# Patient Record
Sex: FEMALE | Race: WHITE | HISPANIC OR LATINO | Employment: UNEMPLOYED | URBAN - METROPOLITAN AREA
[De-identification: names, ages, dates, MRNs, and addresses within clinical notes are randomized per-mention and may not be internally consistent; named-entity substitution may affect disease eponyms.]

---

## 2017-05-19 PROCEDURE — 93005 ELECTROCARDIOGRAM TRACING: CPT

## 2017-05-20 ENCOUNTER — HOSPITAL ENCOUNTER (EMERGENCY)
Facility: HOSPITAL | Age: 22
Discharge: HOME | End: 2017-05-20
Attending: EMERGENCY MEDICINE | Admitting: EMERGENCY MEDICINE
Payer: COMMERCIAL

## 2017-05-20 VITALS
TEMPERATURE: 98.2 F | WEIGHT: 130 LBS | HEART RATE: 64 BPM | OXYGEN SATURATION: 100 % | RESPIRATION RATE: 18 BRPM | DIASTOLIC BLOOD PRESSURE: 58 MMHG | SYSTOLIC BLOOD PRESSURE: 109 MMHG

## 2017-05-20 DIAGNOSIS — R55 VASOVAGAL SYNCOPE: Primary | ICD-10-CM

## 2017-05-20 DIAGNOSIS — Z34.91 FIRST TRIMESTER PREGNANCY: ICD-10-CM

## 2017-05-20 LAB
ANION GAP BLD CALC-SCNC: 17 MMOL/L (ref 4–13)
ATRIAL RATE: 65 BPM
ATRIAL RATE: 74 BPM
BILIRUB UR QL STRIP: NEGATIVE
BUN BLD-MCNC: 6 MG/DL (ref 5–25)
CA-I BLD-SCNC: 1.21 MMOL/L (ref 1.12–1.32)
CHLORIDE BLD-SCNC: 103 MMOL/L (ref 100–108)
CLARITY UR: CLEAR
COLOR UR: YELLOW
CREAT BLD-MCNC: 0.6 MG/DL (ref 0.6–1.3)
GFR SERPL CREATININE-BSD FRML MDRD: >60 ML/MIN/1.73SQ M
GLUCOSE SERPL-MCNC: 99 MG/DL (ref 65–140)
GLUCOSE UR STRIP-MCNC: NEGATIVE MG/DL
HCT VFR BLD CALC: 35 % (ref 34.8–46.1)
HGB BLDA-MCNC: 11.9 G/DL (ref 11.5–15.4)
HGB UR QL STRIP.AUTO: NEGATIVE
KETONES UR STRIP-MCNC: NEGATIVE MG/DL
LEUKOCYTE ESTERASE UR QL STRIP: NEGATIVE
NITRITE UR QL STRIP: NEGATIVE
P AXIS: 18 DEGREES
P AXIS: 20 DEGREES
PCO2 BLD: 24 MMOL/L (ref 21–32)
PH UR STRIP.AUTO: 6.5 [PH] (ref 4.5–8)
POTASSIUM BLD-SCNC: 4.1 MMOL/L (ref 3.5–5.3)
PR INTERVAL: 116 MS
PR INTERVAL: 144 MS
PROT UR STRIP-MCNC: NEGATIVE MG/DL
QRS AXIS: 65 DEGREES
QRS AXIS: 78 DEGREES
QRSD INTERVAL: 90 MS
QRSD INTERVAL: 90 MS
QT INTERVAL: 386 MS
QT INTERVAL: 392 MS
QTC INTERVAL: 410 MS
QTC INTERVAL: 428 MS
SODIUM BLD-SCNC: 139 MMOL/L (ref 136–145)
SP GR UR STRIP.AUTO: 1.02 (ref 1–1.03)
SPECIMEN SOURCE: ABNORMAL
T WAVE AXIS: 16 DEGREES
T WAVE AXIS: 51 DEGREES
UROBILINOGEN UR QL STRIP.AUTO: 1 E.U./DL
VENTRICULAR RATE: 66 BPM
VENTRICULAR RATE: 74 BPM

## 2017-05-20 PROCEDURE — 93005 ELECTROCARDIOGRAM TRACING: CPT | Performed by: EMERGENCY MEDICINE

## 2017-05-20 PROCEDURE — 96361 HYDRATE IV INFUSION ADD-ON: CPT

## 2017-05-20 PROCEDURE — 96360 HYDRATION IV INFUSION INIT: CPT

## 2017-05-20 PROCEDURE — 80047 BASIC METABLC PNL IONIZED CA: CPT

## 2017-05-20 PROCEDURE — 85014 HEMATOCRIT: CPT

## 2017-05-20 PROCEDURE — 99284 EMERGENCY DEPT VISIT MOD MDM: CPT

## 2017-05-20 PROCEDURE — 81003 URINALYSIS AUTO W/O SCOPE: CPT | Performed by: EMERGENCY MEDICINE

## 2017-05-20 RX ORDER — BACITRACIN, NEOMYCIN, POLYMYXIN B 400; 3.5; 5 [USP'U]/G; MG/G; [USP'U]/G
OINTMENT TOPICAL
Status: COMPLETED
Start: 2017-05-20 | End: 2017-05-20

## 2017-05-20 RX ORDER — BACITRACIN, NEOMYCIN, POLYMYXIN B 400; 3.5; 5 [USP'U]/G; MG/G; [USP'U]/G
1 OINTMENT TOPICAL ONCE
Status: COMPLETED | OUTPATIENT
Start: 2017-05-20 | End: 2017-05-20

## 2017-05-20 RX ORDER — GINSENG 100 MG
1 CAPSULE ORAL 2 TIMES DAILY
Status: DISCONTINUED | OUTPATIENT
Start: 2017-05-20 | End: 2017-05-20 | Stop reason: HOSPADM

## 2017-05-20 RX ADMIN — BACITRACIN, NEOMYCIN, POLYMYXIN B 1 SMALL APPLICATION: 400; 3.5; 5 OINTMENT TOPICAL at 02:53

## 2017-05-20 RX ADMIN — SODIUM CHLORIDE 1000 ML: 0.9 INJECTION, SOLUTION INTRAVENOUS at 02:33

## 2017-07-08 ENCOUNTER — HOSPITAL ENCOUNTER (EMERGENCY)
Facility: HOSPITAL | Age: 22
Discharge: HOME | End: 2017-07-08
Attending: EMERGENCY MEDICINE | Admitting: EMERGENCY MEDICINE
Payer: COMMERCIAL

## 2017-07-08 VITALS
HEART RATE: 94 BPM | SYSTOLIC BLOOD PRESSURE: 111 MMHG | TEMPERATURE: 98.3 F | WEIGHT: 136 LBS | OXYGEN SATURATION: 99 % | BODY MASS INDEX: 25.68 KG/M2 | DIASTOLIC BLOOD PRESSURE: 56 MMHG | HEIGHT: 61 IN | RESPIRATION RATE: 18 BRPM

## 2017-07-08 DIAGNOSIS — J06.9 VIRAL URI WITH COUGH: Primary | ICD-10-CM

## 2017-07-08 PROCEDURE — 99283 EMERGENCY DEPT VISIT LOW MDM: CPT

## 2017-07-08 RX ORDER — ALBUTEROL SULFATE 90 UG/1
2 AEROSOL, METERED RESPIRATORY (INHALATION) EVERY 6 HOURS PRN
COMMUNITY
End: 2018-11-09 | Stop reason: ALTCHOICE

## 2018-06-28 ENCOUNTER — HOSPITAL ENCOUNTER (EMERGENCY)
Facility: HOSPITAL | Age: 23
Discharge: HOME | End: 2018-06-28
Attending: EMERGENCY MEDICINE | Admitting: EMERGENCY MEDICINE
Payer: COMMERCIAL

## 2018-06-28 VITALS
HEIGHT: 61 IN | SYSTOLIC BLOOD PRESSURE: 119 MMHG | HEART RATE: 107 BPM | TEMPERATURE: 98.2 F | WEIGHT: 147.71 LBS | RESPIRATION RATE: 18 BRPM | BODY MASS INDEX: 27.89 KG/M2 | OXYGEN SATURATION: 98 % | DIASTOLIC BLOOD PRESSURE: 61 MMHG

## 2018-06-28 DIAGNOSIS — J06.9 ACUTE UPPER RESPIRATORY INFECTION: Primary | ICD-10-CM

## 2018-06-28 PROCEDURE — 99283 EMERGENCY DEPT VISIT LOW MDM: CPT

## 2018-06-28 RX ORDER — FLUTICASONE PROPIONATE 50 MCG
1 SPRAY, SUSPENSION (ML) NASAL DAILY
Qty: 16 G | Refills: 0 | Status: SHIPPED | OUTPATIENT
Start: 2018-06-28 | End: 2018-11-09 | Stop reason: ALTCHOICE

## 2018-06-28 RX ORDER — IBUPROFEN 400 MG/1
400 TABLET ORAL ONCE
Status: COMPLETED | OUTPATIENT
Start: 2018-06-28 | End: 2018-06-28

## 2018-06-28 RX ADMIN — IBUPROFEN 400 MG: 400 TABLET ORAL at 10:10

## 2018-06-28 NOTE — DISCHARGE INSTRUCTIONS
Faringitis   CUIDADO AMBULATORIO:   Faringitis o dolor de garganta es la inflamación de los tejidos y estructuras en ta faringe (garganta)  La faringitis es generalmente causada por sofy bacteria  También podría ser causada por un resfriado o el virus de la gripe  Otras causas incluyen el fumar, las alergias o el reflujo estomacal    Signos y síntomas que se pueden presentar con la faringitis:   · Dolor de garganta o dolor al tragar    · Kathreen Mode y everardo corporales    · Ronquera o voz áspera    · Tos, flujo o congestión nasal, comezón en los ojos u ojos llorosos    · Dolor de christopher    · Malestar estomacal y pérdida de apetito    · Rigidez leve en el maria guadalupe    · Glándulas inflamadas que se sienten annamarie bultos duros cuando se toca el maria guadalupe    · Ampollas margoth y bora llenas de pus en la parte de atrás de la garganta  Llame al 911 en john de presentar lo siguiente:   · Usted tiene dificultad para respirar o tragar porque ta garganta está inflamada o adolorida  Busque atención médica de inmediato si:   · Usted está babeando porque le duele demasiado tragar  · Usted tiene fiebre por encima de 102? F (39?C) o le dura más de 3 días  · Usted está confundido  · Usted siente sabor a mackenzie en ta garganta  Pregúntele a ta Roderick Gambler vitaminas y minerales son adecuados para usted  · Ta dolor de garganta empeora  · Usted tiene un bulto adolorido en ta garganta que no se yeni después de 5 días  · Aylin síntomas no mejoran después de 5 días  · Usted tiene preguntas o inquietudes acerca de ta condición o cuidado  El tratamiento para la faringitis:  La faringitis viral desaparecerá por sí herman sin necesidad de tratamiento  Ta dolor de garganta empezará a mejorar dentro de 3 a 5 días en ambos casos de infecciones virales o bacteriales  Es posible que usted necesite alguno de los siguientes:  · Antibióticos  tratan las infecciones bacteriales      · AINEs (Analgésicos antiinflamatorios no esteroides) annamarie el ibuprofeno, ayudan a disminuir la inflamación, el dolor y la [place]  Los AINEs pueden causar sangrado estomacal o problemas renales en ciertas personas  Si usted reid un medicamento anticoagulante, siempre pregúntele a ta médico si los MINA son seguros para usted  Siempre raquel la etiqueta de chris medicamento y [place] instrucciones  · El acetaminofén  [place] el dolor y baja la fiebre  Está disponible sin receta médica  Pregunte la cantidad y la frecuencia con que debe tomarlos  [address]  El acetaminofén puede causar daño en el hígado cuando no se reid de forma correcta  El [place] de ta síntomas:   · Tod gárgaras de agua con sal   Mezcle ¼ de cucharadita de sal en un vaso con 8 onzas de agua tibia y tod gárgaras  Asher podría ayudar a reducir la inflamación en ta garganta  · [address] se le haya indicado  Es posible que usted necesite ingerir mas líquidos de lo habitual  Los líquidos pueden ayudar a aliviar ta garganta y prevenir la deshidratación  Pregunte cuánto líquido debe peg cada día y cuáles líquidos son los más adecuados para usted  · Use un humidificador de vapor frío  para ayudar a humedecer el aire en ta habitación y [place] ta tos  · Alivie ta garganta  con pastillas para la tos, hielo y alimentos blandos o helados de [place]  Prevenga la propagación de la faringitis:  Cúbrase la boca y Lore Gotha and Danita cuando tose o estornuda  No comparta alimentos o bebidas  Lávese las lamar frecuentemente  Utilice agua y Umberto  Si no tiene agua y jabón disponibles, entonces puede usar un alcohol para lamar en gel  Acuda a sanjuana consultas de control con ta médico según le indicaron  Anote sanjuana preguntas para que se acuerde de hacerlas gilberto sanjuana visitas  © 2017 [facility] Information is for End User's use only and may not be sold, redistributed or otherwise used for commercial purposes   All illustrations and images included in CareNotes® are the copyrighted property of A D A M , Inc  or Lefty Roca  Esta información es sólo para uso en educación  Ta intención no es darle un consejo médico sobre enfermedades o tratamientos  Colsulte con ta Benuel Fernández farmacéutico antes de seguir cualquier régimen médico para saber si es seguro y efectivo para usted

## 2018-06-28 NOTE — ED PROVIDER NOTES
History  Chief Complaint   Patient presents with    Asthma     Pt c/o head and chest congestion since yesterday  Pt denies using any inhalers  Pt does not appear to be in distress in triage  25 yo female with questionable history of asthma presents for evaluation of 1d hx of nasal congestion, non productive cough  Symptoms constant, generalized in nature, mild severity  no a/e factors  Denies associated CP, dyspnea, f/c, vomiting  No other c/o at this time  Imp: acute viral URI plan: flonase  Prior to Admission Medications   Prescriptions Last Dose Informant Patient Reported? Taking? Prenatal Vit-Iron Carbonyl-FA (PRENATAL MULTIVITAMIN) TABS   Yes No   Sig: Take 1 tablet by mouth daily   albuterol (PROVENTIL HFA,VENTOLIN HFA) 90 mcg/act inhaler   Yes No   Sig: Inhale 2 puffs every 6 (six) hours as needed for wheezing      Facility-Administered Medications: None       Past Medical History:   Diagnosis Date    Asthma        History reviewed  No pertinent surgical history  History reviewed  No pertinent family history  I have reviewed and agree with the history as documented  Social History   Substance Use Topics    Smoking status: Never Smoker    Smokeless tobacco: Never Used    Alcohol use No        Review of Systems   Constitutional: Negative for chills, fatigue and fever  HENT: Positive for rhinorrhea, sneezing and sore throat  Negative for ear discharge, ear pain, sinus pain and sinus pressure  Respiratory: Positive for cough  Negative for chest tightness and shortness of breath  Cardiovascular: Negative for chest pain and leg swelling  All other systems reviewed and are negative  Physical Exam  Physical Exam   Constitutional: She appears well-developed and well-nourished  HENT:   Head: Normocephalic and atraumatic  Right Ear: External ear normal    Left Ear: External ear normal    Mouth/Throat: Oropharynx is clear and moist  No oropharyngeal exudate     Mild erythema of turbinates bilaterally   Eyes: Conjunctivae and EOM are normal    Neck: Normal range of motion  Neck supple  Cardiovascular: Normal rate, regular rhythm and normal heart sounds  Pulmonary/Chest: Effort normal and breath sounds normal  No stridor  No respiratory distress  She has no wheezes  She has no rales  Musculoskeletal: Normal range of motion  She exhibits no edema  Neurological: She is alert  She exhibits normal muscle tone  Coordination normal    Skin: Skin is warm and dry  No rash noted  Psychiatric: She has a normal mood and affect  Nursing note and vitals reviewed  Vital Signs  ED Triage Vitals [06/28/18 0928]   Temperature Pulse Respirations Blood Pressure SpO2   98 2 °F (36 8 °C) (!) 107 18 119/61 98 %      Temp Source Heart Rate Source Patient Position - Orthostatic VS BP Location FiO2 (%)   Tympanic Monitor Sitting Right arm --      Pain Score       7           Vitals:    06/28/18 0928   BP: 119/61   Pulse: (!) 107   Patient Position - Orthostatic VS: Sitting       Visual Acuity      ED Medications  Medications   ibuprofen (MOTRIN) tablet 400 mg (400 mg Oral Given 6/28/18 1010)       Diagnostic Studies  Results Reviewed     None                 No orders to display              Procedures  Procedures       Phone Contacts  ED Phone Contact    ED Course                               MDM  CritCare Time    Disposition  Final diagnoses:   Acute upper respiratory infection     Time reflects when diagnosis was documented in both MDM as applicable and the Disposition within this note     Time User Action Codes Description Comment    6/28/2018 10:45 AM Ayah Fregoso Add [J06 9] Acute upper respiratory infection       ED Disposition     ED Disposition Condition Comment    Discharge  Matias Goodwin discharge to home/self care      Condition at discharge: Good        Follow-up Information     Follow up With Specialties Details Why Contact Info Additional Information    [facility]  As needed [address]  [phone]  ED, [address], [place], [address] [facility] [place], [address]          Discharge Medication List as of 6/28/2018 10:46 AM      START taking these medications    Details   fluticasone (FLONASE) 50 mcg/act nasal spray 1 spray into each nostril daily, Starting Thu 6/28/2018, Print         CONTINUE these medications which have NOT CHANGED    Details   albuterol (PROVENTIL HFA,VENTOLIN HFA) 90 mcg/act inhaler Inhale 2 puffs every 6 (six) hours as needed for wheezing, Historical Med      Prenatal Vit-Iron Carbonyl-FA (PRENATAL MULTIVITAMIN) TABS Take 1 tablet by mouth daily, Historical Med           No discharge procedures on file      ED Provider  Electronically Signed by           Ayah Fregoso DO  06/28/18 1127

## 2018-11-09 ENCOUNTER — HOSPITAL ENCOUNTER (EMERGENCY)
Facility: HOSPITAL | Age: 23
Discharge: HOME | End: 2018-11-09
Attending: EMERGENCY MEDICINE | Admitting: EMERGENCY MEDICINE
Payer: COMMERCIAL

## 2018-11-09 VITALS
WEIGHT: 147.71 LBS | OXYGEN SATURATION: 100 % | HEART RATE: 65 BPM | BODY MASS INDEX: 27.91 KG/M2 | TEMPERATURE: 98.3 F | DIASTOLIC BLOOD PRESSURE: 56 MMHG | SYSTOLIC BLOOD PRESSURE: 95 MMHG | RESPIRATION RATE: 18 BRPM

## 2018-11-09 DIAGNOSIS — K29.70 GASTRITIS: Primary | ICD-10-CM

## 2018-11-09 DIAGNOSIS — N39.0 UTI (URINARY TRACT INFECTION): ICD-10-CM

## 2018-11-09 LAB
ALBUMIN SERPL BCP-MCNC: 4.3 G/DL (ref 3.5–5)
ALP SERPL-CCNC: 99 U/L (ref 46–116)
ALT SERPL W P-5'-P-CCNC: 13 U/L (ref 12–78)
ANION GAP SERPL CALCULATED.3IONS-SCNC: 10 MMOL/L (ref 4–13)
AST SERPL W P-5'-P-CCNC: 13 U/L (ref 5–45)
BACTERIA UR QL AUTO: ABNORMAL /HPF
BASOPHILS # BLD AUTO: 0.04 THOUSANDS/ΜL (ref 0–0.1)
BASOPHILS NFR BLD AUTO: 0 % (ref 0–1)
BILIRUB SERPL-MCNC: 0.28 MG/DL (ref 0.2–1)
BILIRUB UR QL STRIP: ABNORMAL
BUN SERPL-MCNC: 15 MG/DL (ref 5–25)
CALCIUM SERPL-MCNC: 9.3 MG/DL (ref 8.3–10.1)
CHLORIDE SERPL-SCNC: 106 MMOL/L (ref 100–108)
CLARITY UR: CLEAR
CLARITY, POC: CLEAR
CO2 SERPL-SCNC: 24 MMOL/L (ref 21–32)
COLOR UR: YELLOW
COLOR, POC: YELLOW
CREAT SERPL-MCNC: 0.95 MG/DL (ref 0.6–1.3)
EOSINOPHIL # BLD AUTO: 0.05 THOUSAND/ΜL (ref 0–0.61)
EOSINOPHIL NFR BLD AUTO: 0 % (ref 0–6)
ERYTHROCYTE [DISTWIDTH] IN BLOOD BY AUTOMATED COUNT: 12 % (ref 11.6–15.1)
EXT PREG TEST URINE: NEGATIVE
GFR SERPL CREATININE-BSD FRML MDRD: 85 ML/MIN/1.73SQ M
GLUCOSE SERPL-MCNC: 99 MG/DL (ref 65–140)
GLUCOSE UR STRIP-MCNC: NEGATIVE MG/DL
HCT VFR BLD AUTO: 45.8 % (ref 34.8–46.1)
HGB BLD-MCNC: 14.8 G/DL (ref 11.5–15.4)
HGB UR QL STRIP.AUTO: ABNORMAL
IMM GRANULOCYTES # BLD AUTO: 0.05 THOUSAND/UL (ref 0–0.2)
IMM GRANULOCYTES NFR BLD AUTO: 0 % (ref 0–2)
KETONES UR STRIP-MCNC: NEGATIVE MG/DL
LEUKOCYTE ESTERASE UR QL STRIP: ABNORMAL
LIPASE SERPL-CCNC: 201 U/L (ref 73–393)
LYMPHOCYTES # BLD AUTO: 0.9 THOUSANDS/ΜL (ref 0.6–4.47)
LYMPHOCYTES NFR BLD AUTO: 6 % (ref 14–44)
MCH RBC QN AUTO: 26.8 PG (ref 26.8–34.3)
MCHC RBC AUTO-ENTMCNC: 32.3 G/DL (ref 31.4–37.4)
MCV RBC AUTO: 83 FL (ref 82–98)
MONOCYTES # BLD AUTO: 1 THOUSAND/ΜL (ref 0.17–1.22)
MONOCYTES NFR BLD AUTO: 7 % (ref 4–12)
MUCOUS THREADS UR QL AUTO: ABNORMAL
NEUTROPHILS # BLD AUTO: 12.41 THOUSANDS/ΜL (ref 1.85–7.62)
NEUTS SEG NFR BLD AUTO: 87 % (ref 43–75)
NITRITE UR QL STRIP: NEGATIVE
NON-SQ EPI CELLS URNS QL MICRO: ABNORMAL /HPF
NRBC BLD AUTO-RTO: 0 /100 WBCS
PH UR STRIP.AUTO: 5.5 [PH] (ref 4.5–8)
PLATELET # BLD AUTO: 276 THOUSANDS/UL (ref 149–390)
PMV BLD AUTO: 10.4 FL (ref 8.9–12.7)
POTASSIUM SERPL-SCNC: 4.2 MMOL/L (ref 3.5–5.3)
PROT SERPL-MCNC: 8 G/DL (ref 6.4–8.2)
PROT UR STRIP-MCNC: NEGATIVE MG/DL
RBC # BLD AUTO: 5.53 MILLION/UL (ref 3.81–5.12)
RBC #/AREA URNS AUTO: ABNORMAL /HPF
SODIUM SERPL-SCNC: 140 MMOL/L (ref 136–145)
SP GR UR STRIP.AUTO: >=1.03 (ref 1–1.03)
UROBILINOGEN UR QL STRIP.AUTO: 0.2 E.U./DL
WBC # BLD AUTO: 14.45 THOUSAND/UL (ref 4.31–10.16)
WBC #/AREA URNS AUTO: ABNORMAL /HPF

## 2018-11-09 PROCEDURE — 80053 COMPREHEN METABOLIC PANEL: CPT | Performed by: PHYSICIAN ASSISTANT

## 2018-11-09 PROCEDURE — 99284 EMERGENCY DEPT VISIT MOD MDM: CPT

## 2018-11-09 PROCEDURE — 36415 COLL VENOUS BLD VENIPUNCTURE: CPT | Performed by: PHYSICIAN ASSISTANT

## 2018-11-09 PROCEDURE — 83690 ASSAY OF LIPASE: CPT | Performed by: PHYSICIAN ASSISTANT

## 2018-11-09 PROCEDURE — 81001 URINALYSIS AUTO W/SCOPE: CPT

## 2018-11-09 PROCEDURE — 96374 THER/PROPH/DIAG INJ IV PUSH: CPT

## 2018-11-09 PROCEDURE — 96361 HYDRATE IV INFUSION ADD-ON: CPT

## 2018-11-09 PROCEDURE — 81025 URINE PREGNANCY TEST: CPT | Performed by: PHYSICIAN ASSISTANT

## 2018-11-09 PROCEDURE — 85025 COMPLETE CBC W/AUTO DIFF WBC: CPT | Performed by: PHYSICIAN ASSISTANT

## 2018-11-09 RX ORDER — MAGNESIUM HYDROXIDE/ALUMINUM HYDROXICE/SIMETHICONE 120; 1200; 1200 MG/30ML; MG/30ML; MG/30ML
30 SUSPENSION ORAL ONCE
Status: COMPLETED | OUTPATIENT
Start: 2018-11-09 | End: 2018-11-09

## 2018-11-09 RX ORDER — DICYCLOMINE HCL 20 MG
20 TABLET ORAL 2 TIMES DAILY
Qty: 20 TABLET | Refills: 0 | Status: SHIPPED | OUTPATIENT
Start: 2018-11-09

## 2018-11-09 RX ORDER — ONDANSETRON 4 MG/1
4 TABLET, ORALLY DISINTEGRATING ORAL EVERY 8 HOURS PRN
Qty: 12 TABLET | Refills: 0 | Status: SHIPPED | OUTPATIENT
Start: 2018-11-09

## 2018-11-09 RX ORDER — ONDANSETRON 2 MG/ML
4 INJECTION INTRAMUSCULAR; INTRAVENOUS ONCE
Status: COMPLETED | OUTPATIENT
Start: 2018-11-09 | End: 2018-11-09

## 2018-11-09 RX ORDER — NITROFURANTOIN 25; 75 MG/1; MG/1
100 CAPSULE ORAL 2 TIMES DAILY
Qty: 10 CAPSULE | Refills: 0 | Status: SHIPPED | OUTPATIENT
Start: 2018-11-09

## 2018-11-09 RX ADMIN — ALUMINUM HYDROXIDE, MAGNESIUM HYDROXIDE, AND SIMETHICONE 30 ML: 200; 200; 20 SUSPENSION ORAL at 10:07

## 2018-11-09 RX ADMIN — LIDOCAINE HYDROCHLORIDE 15 ML: 20 SOLUTION ORAL; TOPICAL at 10:07

## 2018-11-09 RX ADMIN — ONDANSETRON 4 MG: 2 INJECTION INTRAMUSCULAR; INTRAVENOUS at 09:38

## 2018-11-09 RX ADMIN — SODIUM CHLORIDE 1000 ML: 0.9 INJECTION, SOLUTION INTRAVENOUS at 09:30

## 2018-11-09 NOTE — ED PROVIDER NOTES
History  Chief Complaint   Patient presents with    Vomiting     pt c/o vomiting and diarrhea since 540am  pt states she tried to take peptobismol but unable to keep down  pt also c/o cough x 2 weeks  denies fevers/chills  denies sick contacts   Diarrhea     23y  o female with PMH of asthma presents to the ER for epigastric abdominal pain, nausea, non-bloody vomiting and non-bloody diarrhea since 05:30 today  Patient took Mirian Gander without relief  She describes her abdominal pain as cramping and non-radiating  Symptoms are constant  She denies sick contacts or recent travel  She denies fever, chills, chest pain, dyspnea, urinary symptoms, weakness or paresthesias  History provided by:  Patient   used: No        None       Past Medical History:   Diagnosis Date    Asthma        History reviewed  No pertinent surgical history  History reviewed  No pertinent family history  I have reviewed and agree with the history as documented  Social History   Substance Use Topics    Smoking status: Never Smoker    Smokeless tobacco: Never Used    Alcohol use No        Review of Systems   Constitutional: Negative for chills and fever  Eyes: Negative for redness  Respiratory: Negative for shortness of breath  Cardiovascular: Negative for chest pain  Gastrointestinal: Positive for abdominal pain, diarrhea, nausea and vomiting  Genitourinary: Negative for dysuria, frequency, hematuria and urgency  Musculoskeletal: Negative for neck stiffness  Skin: Negative for rash  Allergic/Immunologic: Negative for food allergies  Neurological: Negative for weakness and numbness  Physical Exam  Physical Exam   Constitutional:  Non-toxic appearance  No distress  HENT:   Head: Normocephalic and atraumatic  Right Ear: Tympanic membrane, external ear and ear canal normal  No drainage, swelling or tenderness  No foreign bodies  Tympanic membrane is not erythematous   No hemotympanum  Left Ear: Tympanic membrane, external ear and ear canal normal  No drainage, swelling or tenderness  No foreign bodies  Tympanic membrane is not erythematous  No hemotympanum  Nose: Nose normal    Mouth/Throat: Uvula is midline, oropharynx is clear and moist and mucous membranes are normal  No uvula swelling  No posterior oropharyngeal edema, posterior oropharyngeal erythema or tonsillar abscesses  No tonsillar exudate  Neck: Normal range of motion and phonation normal  Neck supple  No tracheal deviation present  Cardiovascular: Normal rate, regular rhythm, S1 normal, S2 normal and normal heart sounds  Exam reveals no gallop and no friction rub  No murmur heard  Pulmonary/Chest: Effort normal and breath sounds normal  No respiratory distress  She has no decreased breath sounds  She has no wheezes  She has no rhonchi  She has no rales  She exhibits no tenderness  Abdominal: Soft  Bowel sounds are normal  She exhibits no distension  There is tenderness in the epigastric area  There is no rebound, no guarding and no CVA tenderness  Neurological: She is alert  GCS eye subscore is 4  GCS verbal subscore is 5  GCS motor subscore is 6  Skin: Skin is warm and dry  No rash noted  Psychiatric: She has a normal mood and affect  Nursing note and vitals reviewed        Vital Signs  ED Triage Vitals [11/09/18 0859]   Temperature Pulse Respirations Blood Pressure SpO2   98 °F (36 7 °C) 92 16 115/56 98 %      Temp Source Heart Rate Source Patient Position - Orthostatic VS BP Location FiO2 (%)   Oral Monitor Sitting Right arm --      Pain Score       8           Vitals:    11/09/18 0859 11/09/18 1030   BP: 115/56 94/56   Pulse: 92 60   Patient Position - Orthostatic VS: Sitting Lying       Visual Acuity      ED Medications  Medications   sodium chloride 0 9 % bolus 1,000 mL (0 mL Intravenous Stopped 11/9/18 1005)   ondansetron (ZOFRAN) injection 4 mg (4 mg Intravenous Given 11/9/18 0938) lidocaine viscous (XYLOCAINE) 2 % mucosal solution 15 mL (15 mL Swish & Swallow Given 11/9/18 1007)   aluminum-magnesium hydroxide-simethicone (MYLANTA) 200-200-20 mg/5 mL oral suspension 30 mL (30 mL Oral Given 11/9/18 1007)       Diagnostic Studies  Results Reviewed     Procedure Component Value Units Date/Time    Urine Microscopic [[id number]]  (Abnormal) Collected:  11/09/18 1034    Lab Status:  Final result Specimen:  Urine from Urine, Clean Catch Updated:  11/09/18 1117     RBC, UA None Seen /hpf      WBC, UA 2-4 (A) /hpf      Epithelial Cells Occasional /hpf      Bacteria, UA Moderate (A) /hpf      MUCUS THREADS Moderate (A)    POCT urinalysis dipstick [[id number]]  (Normal) Resulted:  11/09/18 1021    Lab Status:  Final result Specimen:  Urine Updated:  11/09/18 1022     Color, UA yellow     Clarity, UA clear    POCT pregnancy, urine [[id number]]  (Normal) Resulted:  11/09/18 1022    Lab Status:  Final result Updated:  11/09/18 1022     EXT PREG TEST UR (Ref: Negative) negative    ED Urine Macroscopic [[id number]]  (Abnormal) Collected:  11/09/18 1034    Lab Status:  Final result Specimen:  Urine Updated:  11/09/18 1021     Color, UA Yellow     Clarity, UA Clear     pH, UA 5 5     Leukocytes, UA Small (A)     Nitrite, UA Negative     Protein, UA Negative mg/dl      Glucose, UA Negative mg/dl      Ketones, UA Negative mg/dl      Urobilinogen, UA 0 2 E U /dl      Bilirubin, UA Interference- unable to analyze (A)     Blood, UA Trace (A)     Specific Gravity, UA >=1 030    Narrative:       CLINITEK RESULT    Comprehensive metabolic panel [[id number]] Collected:  11/09/18 0925    Lab Status:  Final result Specimen:  Blood from Arm, Left Updated:  11/09/18 0956     Sodium 140 mmol/L      Potassium 4 2 mmol/L      Chloride 106 mmol/L      CO2 24 mmol/L      ANION GAP 10 mmol/L      BUN 15 mg/dL      Creatinine 0 95 mg/dL      Glucose 99 mg/dL      Calcium 9 3 mg/dL      AST 13 U/L      ALT 13 U/L      Alkaline Phosphatase 99 U/L      Total Protein 8 0 g/dL      Albumin 4 3 g/dL      Total Bilirubin 0 28 mg/dL      eGFR 85 ml/min/1 73sq m     Narrative:         National Kidney Disease Education Program recommendations are as follows:  GFR calculation is accurate only with a steady state creatinine  Chronic Kidney disease less than 60 ml/min/1 73 sq  meters  Kidney failure less than 15 ml/min/1 73 sq  meters  Lipase [[id number]]  (Normal) Collected:  11/09/18 0925    Lab Status:  Final result Specimen:  Blood from Arm, Left Updated:  11/09/18 0956     Lipase 201 u/L     CBC and differential [[id number]]  (Abnormal) Collected:  11/09/18 0925    Lab Status:  Final result Specimen:  Blood from Arm, Left Updated:  11/09/18 0939     WBC 14 45 (H) Thousand/uL      RBC 5 53 (H) Million/uL      Hemoglobin 14 8 g/dL      Hematocrit 45 8 %      MCV 83 fL      MCH 26 8 pg      MCHC 32 3 g/dL      RDW 12 0 %      MPV 10 4 fL      Platelets 829 Thousands/uL      nRBC 0 /100 WBCs      Neutrophils Relative 87 (H) %      Immat GRANS % 0 %      Lymphocytes Relative 6 (L) %      Monocytes Relative 7 %      Eosinophils Relative 0 %      Basophils Relative 0 %      Neutrophils Absolute 12 41 (H) Thousands/µL      Immature Grans Absolute 0 05 Thousand/uL      Lymphocytes Absolute 0 90 Thousands/µL      Monocytes Absolute 1 00 Thousand/µL      Eosinophils Absolute 0 05 Thousand/µL      Basophils Absolute 0 04 Thousands/µL                  No orders to display              Procedures  Procedures       Phone Contacts  ED Phone Contact    ED Course                               MDM  Number of Diagnoses or Management Options  Gastritis: new and requires workup  UTI (urinary tract infection): new and requires workup  Diagnosis management comments: DDX consists of but not limited to: pancreatitis, gastritis, gastroenteritis, obstruction    Will check CBC, CMP, lipase, urine and pregnancy  Will give mylanta, viscous lidocaine, zofran and fluids      At discharge, I instructed the patient to:  -follow up with pcp  -take Bentyl as prescribed for abdominal cramping  -take Zofran as prescribed for nausea and vomiting  -take Macrobid as prescribed for UTI  -rest and drink plenty of fluids  -follow a bland diet  -return to the ER if symptoms worsened or new symptoms arose  Patient agreed to this plan and was stable at time of discharge  Amount and/or Complexity of Data Reviewed  Clinical lab tests: ordered and reviewed    Patient Progress  Patient progress: stable    CritCare Time    Disposition  Final diagnoses:   None     ED Disposition     None      Follow-up Information    None         Patient's Medications   Discharge Prescriptions    No medications on file     No discharge procedures on file      ED Provider  Electronically Signed by           Andree Jackson PA-C  11/09/18 3844

## 2021-06-01 NOTE — DISCHARGE INSTRUCTIONS
Gastritis   LO QUE NECESITA SABER:   La gastritis es sofy inflamación o irritación del revestimiento del estómago  INSTRUCCIONES SOBRE [facility]ITALARIA:   Llame al 911 en john de presentar lo siguiente:   · Tiene dolor de pecho o le falta el aire  Regrese a la sukhjinder de emergencias si:   · Usted vomita mackenzie  · Usted tiene evacuaciones intestinales negras o con mackenzie  · Usted tiene un cally dolor de estómago o de espalda  Pregúntele a ta Shoshana Guise vitaminas y minerales son adecuados para usted  · Usted tiene fiebre  · Usted tiene síntomas nuevos o estos empeoran, aun después del [place]  · Usted tiene preguntas o inquietudes acerca de ta condición o cuidado  Medicamentos:   · Medicamentos,  para ayudar a tratar la infección bacteriana o para disminuir el ácido estomacal      · Kuna sanjuana medicamentos annamarie se le haya indicado  Consulte con ta médico si usted prerna que ta medicamento no le está ayudando o si presenta efectos secundarios  Infórmele si es alérgico a cualquier medicamento  Mantenga sofy lista actualizada de los [employer], las vitaminas y los productos herbales que reid  Incluya los siguientes datos de los medicamentos: cantidad, frecuencia y motivo de administración  Traiga con usted la lista o los envases de la píldoras a sanjuana citas de seguimiento  Lleve la lista de los medicamentos con usted en john de sofy emergencia  Maneje o evite la gastritis:   · No fume  La nicotina y otras sustancias químicas de los cigarrillos y los cigarros pueden empeorar sanjuana síntomas y causar daño pulmonar  Pida información a ta médico si usted actualmente fuma y necesita ayuda para dejar de fumar  Los cigarrillos electrónicos o tabaco sin humo todavía contienen nicotina  Consulte con ta médico antes de [employer]  · No consuma alcohol  El alcohol puede evitar la cicatrización y empeorar la gastritis   Consulte con ta médico si usted necesita ayuda para dejar de peg Quality 130: Documentation Of Current Medications In The Medical Record: Current Medications Documented alcohol  · No tome medicamentos MINA o aspirina a menos que así se lo indiquen  Estos analgésicos y medicamentos similares pueden causar irritación  Si christianson médico lo autoriza a peg [place], tómelos con la comida  · No coma alimentos que le provocan irritación:  Los alimentos annamarie las naranjas y la salsa pueden causar ardor o dolor  Consuma alimentos saludables y variados  Unos [address] frutas (no las cítricas), verduras, productos lácteos descremados, legumbres, pan integral al [employer] las stan [place] y pescado  Trate de comer porciones más pequeñas y peg agua con sanjuana comidas  No coma nada al menos por 3 horas antes de acostarse  · Encuentre maneras de relajarse y reducir el estrés  El estrés puede aumentar el ácido estomacal y empeorar la gastritis  Las [employer] yoga, la meditación o el escuchar música pueden ayudarlo a [place]  Pase tiempo con amigos, o tod cosas que disfruta  Acuda a sanjuana consultas de control con christianson médico según le indicaron  Puede que necesite exámenes o tratamiento continuos o derivación a un gastroenterólogo  Anote sanjuana preguntas para que se acuerde de hacerlas gilberto sanjuana visitas  © 2017 [address]  Information is for End User's use only and may not be sold, redistributed or otherwise used for commercial purposes  All illustrations and images included in CareNotes® are the copyrighted property of A D A M , Inc  or Lefty Roca  Esta información es sólo para uso en educación  Christianson intención no es darle un consejo médico sobre enfermedades o tratamientos  Colsulte con christianson Lesleigh Kevin farmacéutico antes de seguir cualquier régimen médico para saber si es seguro y efectivo para usted  Infección del tracto urinario en mujeres   LO QUE NECESITA SABER:   Deborah infección en el tracto urinario (ITU) ocurre cuando entran bacterias en christianson tracto urinario   [address] bacterias que entran al tracto [place] Quality 226: Preventive Care And Screening: Tobacco Use: Screening And Cessation Intervention: Patient screened for tobacco use and is an ex/non-smoker salen al Artelia Lean  Si la bacteria permanece en el tracto urinario, usted podría contraer Pitney Mihir  Ta tracto urinario incluye sanjuana riñones, uréteres, vejiga y [place]  La orina es producida en los riñones y fluye del uréter a la vejiga  La orina sale de la vejiga a través de la uretra  Sofy infección del tracto urinario es más común in [place] parte inferior de ta tracto urinario que incluye ta vejiga y uretra  INSTRUCCIONES SOBRE EL FRED HOSPITALARIA:   Regrese a la sukhjinder de emergencias si:   · Usted está orinando muy poco o nada en absoluto  · Usted tiene fiebre fred con temblor y escalofríos  · Usted tiene dolor en el costado o en la espalda que ADONIS  Pregúntele a ta Vita Pel vitaminas y minerales son adecuados para usted  · Usted tiene fiebre  · Usted no siente mejoría después de 2 días de peg los antibióticos  · Usted esta vomitando  · Usted tiene preguntas o inquietudes acerca de ta condición o cuidado  Medicamentos:   · Antibióticos  ayudan a combatir sofy infección bacterial      · Medicamentos,  para disminuir el dolor y el ardor al orinar  También ayudarán a disminuir la sensación de necesitar orinar con frecuencia  Estos medicamentos harán que orine de color anaranjado o gao  · [place] sanjuana medicamentos annamarie se le haya indicado  Consulte con ta médico si usted prerna que ta medicamento no le está ayudando o si presenta efectos secundarios  Infórmele si es alérgico a cualquier medicamento  Mantenga sofy lista actualizada de los [place], las vitaminas y los productos herbales que reid  Incluya los siguientes datos de los medicamentos: cantidad, frecuencia y motivo de administración  Traiga con usted la lista o los envases de la píldoras a sanjuana citas de seguimiento  Lleve la lista de los medicamentos con usted en john de sofy emergencia  Acuda a sanjuana consultas de control con ta médico según le indicaron  Anote sanjuana preguntas para que se acuerde de hacerlas gilberto sanjuana visitas  Evite otra ITU:   · Vacíe la vejiga con frecuencia  Orine y vacíe la vejiga tan pronto annamarie usted sienta la necesidad  No retenga la orina por largos períodos de [place]  · Límpiese de adelante hacia atrás después de orinar o de tener sofy evacuación intestinal   [place] ayudará a evitar que los gérmenes entren en el tracto urinario a través de la uretra  · [address] se enmanuel haya indicado  Pregunte cuánto líquido debe peg cada día y cuáles líquidos son los más adecuados para usted  Es probable que usted necesite peg más líquidos de lo habitual para ayudar a deshacerse de la bacteria  No tome alcohol, cafeína ni jugos cítricos  Estos pueden irritar christianson vejiga y aumentar sanjuana síntomas  Christianson médico puede recomendarle el jugo de arándano para prevenir sofy infección Kiara Sloan  · Orine después de [employer]  [place] puede ayudar a eliminar las bacterias que pasan gilberto el sexo  · No tome duchas vaginales ni use desodorantes femeninos  Estos pueden cambiar el equilibrio químico de la vagina  · [place] las compresas o los tampones a menudo  [place] ayudará a evitar que los gérmenes entren en el tracto urinario  · Realice ejercicios para el piso pélvico con frecuencia  Los músculos pélvicos ayudan a empezar y parar de orinar  Los músculos pélvicos ana ayudan a vaciar la vejiga más fácilmente  Apriete estos músculos firmemente gilberto 5 segundos annamarie si estuviera tratando de retener el flujo de [place]  Luego relaje por 5 segundos  Aumente gradualmente a 10 segundos  Cali 3 series de 15 repeticiones al día o annamarie se enmanuel [place]  © 2017 [address] [place] Information is for End User's use only and may not be sold, redistributed or otherwise used for commercial purposes  All illustrations and images included in CareNotes® are the copyrighted property of A D A M , Inc  or Lefty Roca  Esta información es sólo para uso en educación   Christianson intención no es darle un consejo Quality 431: Preventive Care And Screening: Unhealthy Alcohol Use - Screening: Patient screened for unhealthy alcohol use using a single question and scores less than 2 times per year Detail Level: Detailed médico sobre enfermedades o tratamientos  Colsulte con ta Juanita Horvath farmacéutico antes de seguir cualquier régimen médico para saber si es seguro y efectivo para usted  DISCHARGE INSTRUCTIONS:    FOLLOW UP WITH YOUR PRIMARY CARE PROVIDER OR THE [address] [place] [address]  MAKE AN APPOINTMENT TO BE SEEN  TAKE BENTYL AS PRESCRIBED FOR ABDOMINAL CRAMPING  IF RASH, SHORTNESS OF BREATH OR TROUBLE SWALLOWING, STOP TAKING THE MEDICATION AND BE SEEN  TAKE ZOFRAN AS PRESCRIBED FOR NAUSEA AND VOMITING  IF RASH, SHORTNESS OF BREATH OR TROUBLE SWALLOWING, STOP TAKING THE MEDICATION AND BE SEEN  TAKE MACROBID AS PRESCRIBED FOR UTI  IF RASH, SHORTNESS OF BREATH OR TROUBLE SWALLOWING, STOP TAKING THE MEDICATION AND BE SEEN  REST AND DRINK PLENTY OF FLUIDS  FOLLOW A BLAND DIET  IF SYMPTOMS WORSEN OR NEW SYMPTOMS ARISE, RETURN TO THE ER TO BE SEEN